# Patient Record
Sex: MALE | Race: BLACK OR AFRICAN AMERICAN | Employment: UNEMPLOYED
[De-identification: names, ages, dates, MRNs, and addresses within clinical notes are randomized per-mention and may not be internally consistent; named-entity substitution may affect disease eponyms.]

---

## 2022-03-19 PROBLEM — E78.5 HYPERLIPIDEMIA: Status: ACTIVE | Noted: 2018-03-13

## 2022-03-19 PROBLEM — R73.01 IMPAIRED FASTING GLUCOSE: Status: ACTIVE | Noted: 2018-03-13

## 2022-03-19 PROBLEM — Q90.9 DOWN SYNDROME: Status: ACTIVE | Noted: 2018-03-13

## 2022-10-25 ENCOUNTER — NURSE TRIAGE (OUTPATIENT)
Dept: OTHER | Facility: CLINIC | Age: 32
End: 2022-10-25

## 2022-10-25 NOTE — TELEPHONE ENCOUNTER
Location of patient: [address] [place] [address]    Received call from April Rhyme at [employer] with "[employer]. Subjective: Caller states \"Patient woke up this morning and his jaw on the left side of his face is swollen\"     Current Symptoms: Swollen left jaw    Onset: today    Associated Symptoms: NA    Pain Severity: unable to assess due to pt's medical condition; pt will not allow dad close to the left side of his face, touch it or open his mouth    Temperature: difficulty obtaining oral temp, 95.9 (pt not cooperative) per dad pt does not feel feverish    What has been tried: nothing    LMP: NA Pregnant: NA    Recommended disposition: See PCP within 24 Hours    Care advice provided, patient verbalizes understanding; denies any other questions or concerns; instructed to call back for any new or worsening symptoms. Patient/Caller agrees with recommended disposition; writer provided warm transfer to [facility] for appointment scheduling    Attention Provider: Thank you for allowing me to participate in the care of your patient. The patient was connected to triage in response to information provided to the E[facility]/PSC. Please do not respond through this encounter as the response is not directed to a shared pool. Reason for Disposition   Face swelling is painful to touch    Protocols used:  Face Swelling-ADULT-AH

## 2022-10-26 ENCOUNTER — OFFICE VISIT (OUTPATIENT)
Dept: PRIMARY CARE CLINIC | Facility: CLINIC | Age: 32
End: 2022-10-26
Payer: MEDICAID

## 2022-10-26 VITALS
WEIGHT: 156 LBS | SYSTOLIC BLOOD PRESSURE: 133 MMHG | RESPIRATION RATE: 17 BRPM | BODY MASS INDEX: 28.71 KG/M2 | DIASTOLIC BLOOD PRESSURE: 76 MMHG | HEIGHT: 62 IN | TEMPERATURE: 97.7 F | OXYGEN SATURATION: 98 % | HEART RATE: 97 BPM

## 2022-10-26 DIAGNOSIS — E78.2 MIXED HYPERLIPIDEMIA: ICD-10-CM

## 2022-10-26 DIAGNOSIS — K04.7 DENTAL ABSCESS: Primary | ICD-10-CM

## 2022-10-26 DIAGNOSIS — R73.01 IMPAIRED FASTING GLUCOSE: ICD-10-CM

## 2022-10-26 DIAGNOSIS — Q90.9 DOWN SYNDROME: ICD-10-CM

## 2022-10-26 PROBLEM — E78.5 HYPERLIPIDEMIA: Status: RESOLVED | Noted: 2018-03-13 | Resolved: 2022-10-26

## 2022-10-26 PROCEDURE — 99213 OFFICE O/P EST LOW 20 MIN: CPT | Performed by: FAMILY MEDICINE

## 2022-10-26 RX ORDER — IBUPROFEN 600 MG/1
TABLET ORAL
Qty: 30 TABLET | Refills: 0 | Status: SHIPPED | OUTPATIENT
Start: 2022-10-26

## 2022-10-26 RX ORDER — AMOXICILLIN AND CLAVULANATE POTASSIUM 875; 125 MG/1; MG/1
1 TABLET, FILM COATED ORAL 2 TIMES DAILY
Qty: 20 TABLET | Refills: 0 | Status: SHIPPED | OUTPATIENT
Start: 2022-10-26 | End: 2022-11-05

## 2022-10-26 ASSESSMENT — PATIENT HEALTH QUESTIONNAIRE - PHQ9
SUM OF ALL RESPONSES TO PHQ QUESTIONS 1-9: 0
SUM OF ALL RESPONSES TO PHQ9 QUESTIONS 1 & 2: 0
SUM OF ALL RESPONSES TO PHQ QUESTIONS 1-9: 0
2. FEELING DOWN, DEPRESSED OR HOPELESS: 0
SUM OF ALL RESPONSES TO PHQ QUESTIONS 1-9: 0
1. LITTLE INTEREST OR PLEASURE IN DOING THINGS: 0
SUM OF ALL RESPONSES TO PHQ QUESTIONS 1-9: 0

## 2022-10-26 ASSESSMENT — ENCOUNTER SYMPTOMS
SHORTNESS OF BREATH: 0
SINUS PRESSURE: 0
CONSTIPATION: 0
FACIAL SWELLING: 1
VOMITING: 0
COLOR CHANGE: 0
BLOOD IN STOOL: 0
RHINORRHEA: 0
DIARRHEA: 0
BACK PAIN: 0
EYE PAIN: 0
SINUS PAIN: 0
WHEEZING: 0
TROUBLE SWALLOWING: 0
EYE DISCHARGE: 0
VOICE CHANGE: 0
CHEST TIGHTNESS: 0
SORE THROAT: 0
EYE REDNESS: 0
NAUSEA: 0
COUGH: 0
CHOKING: 0
ABDOMINAL PAIN: 0
PHOTOPHOBIA: 0
ABDOMINAL DISTENTION: 0

## 2022-10-26 NOTE — PROGRESS NOTES
Here for follow-up swelling in the left side of her mouth for last few days dental pain caries. Has a follow-up with dentist     came with dad down syndrome, has lost some weight since last visit history prediabetic hemoglobin A1c 6.4. Review of Systems   Constitutional:  Negative for activity change, appetite change, chills, diaphoresis, fatigue, fever and unexpected weight change. HENT:  Positive for facial swelling. Negative for congestion, ear pain, hearing loss, nosebleeds, rhinorrhea, sinus pressure, sinus pain, sore throat, trouble swallowing and voice change. Left-sided facial swelling dental pain, dental abscess   Eyes:  Negative for photophobia, pain, discharge, redness and visual disturbance. Respiratory:  Negative for cough, choking, chest tightness, shortness of breath and wheezing. Cardiovascular:  Negative for chest pain, palpitations and leg swelling. Gastrointestinal:  Negative for abdominal distention, abdominal pain, blood in stool, constipation, diarrhea, nausea and vomiting. Endocrine: Negative for polydipsia, polyphagia and polyuria. Genitourinary:  Negative for difficulty urinating, dysuria, frequency, genital sores, hematuria and urgency. Musculoskeletal:  Negative for arthralgias, back pain, gait problem, joint swelling, myalgias and neck pain. Skin:  Negative for color change and rash. Neurological:  Negative for dizziness, tremors, seizures, syncope, speech difficulty, weakness, numbness and headaches. Hematological:  Negative for adenopathy. Does not bruise/bleed easily. Psychiatric/Behavioral:  Negative for behavioral problems, confusion, decreased concentration, hallucinations, self-injury, sleep disturbance and suicidal ideas. The patient is not nervous/anxious. Physical Exam  Vitals and nursing note reviewed. Constitutional:       General: He is not in acute distress. Appearance: Normal appearance.  He is not ill-appearing, toxic-appearing or diaphoretic. HENT:      Head: Atraumatic. Right Ear: External ear normal.      Left Ear: External ear normal.      Nose: Nose normal. No congestion or rhinorrhea. Mouth/Throat:      Mouth: Mucous membranes are moist.      Pharynx: No oropharyngeal exudate or posterior oropharyngeal erythema. Comments: , ,Swelling pain left side of face difficult exam due to poor cooperation syndrome  Eyes:      General: No scleral icterus. Right eye: No discharge. Left eye: No discharge. Extraocular Movements: Extraocular movements intact. Conjunctiva/sclera: Conjunctivae normal.      Pupils: Pupils are equal, round, and reactive to light. Cardiovascular:      Rate and Rhythm: Normal rate and regular rhythm. Pulses: Normal pulses. Heart sounds: Normal heart sounds. No murmur heard. No friction rub. Pulmonary:      Effort: Pulmonary effort is normal. No respiratory distress. Breath sounds: Normal breath sounds. No stridor. No wheezing, rhonchi or rales. Chest:      Chest wall: No tenderness. Abdominal:      General: Abdomen is flat. There is no distension. Palpations: Abdomen is soft. There is no mass. Tenderness: There is no right CVA tenderness, left CVA tenderness or guarding. Musculoskeletal:         General: No swelling, tenderness, deformity or signs of injury. Cervical back: Neck supple. No rigidity. Right lower leg: No edema. Left lower leg: No edema. Skin:     Findings: No bruising or erythema. Neurological:      General: No focal deficit present. Mental Status: He is alert and oriented to person, place, and time. Mental status is at baseline. Cranial Nerves: No cranial nerve deficit. Sensory: No sensory deficit. Motor: No weakness.       Coordination: Coordination normal.      Gait: Gait normal.      Deep Tendon Reflexes: Reflexes normal.   Psychiatric:         Mood and Affect: Mood normal.      Comments: Down syndrome with intellectual impairment        1. Dental abscess  Antibiotics follow-up with dentist functional status stable at baseline. 2. Down syndrome  Dad no change in functional status primary caregiver    3. Impaired fasting glucose  Diabetic diet weight management    4. Mixed hyperlipidemia  Low-cholesterol diet    5.  BMI 29.0-29.9,adult  Has lost some weight since last visit  Influenza vaccination recommended    Brianne Bowie MD